# Patient Record
Sex: FEMALE | Employment: OTHER | URBAN - METROPOLITAN AREA
[De-identification: names, ages, dates, MRNs, and addresses within clinical notes are randomized per-mention and may not be internally consistent; named-entity substitution may affect disease eponyms.]

---

## 2022-01-06 ENCOUNTER — TELEPHONE (OUTPATIENT)
Dept: NEUROLOGY | Facility: CLINIC | Age: 72
End: 2022-01-06
Payer: MEDICARE

## 2022-01-12 ENCOUNTER — TELEPHONE (OUTPATIENT)
Dept: NEUROLOGY | Facility: CLINIC | Age: 72
End: 2022-01-12
Payer: MEDICARE

## 2022-01-12 NOTE — TELEPHONE ENCOUNTER
----- Message from Dorothy Ervin sent at 1/12/2022  4:32 PM CST -----  Contact: Daughter(Augustus)  Type:  Patient Returning Call    Who Called: Daughter (Augustus)  Who Left Message for Patient:you  Does the patient know what this is regarding?:no  Would the patient rather a call back or a response via [facility]? Call back   Best Call Back Number: [phone]  Additional Information:

## 2022-01-18 ENCOUNTER — TELEPHONE (OUTPATIENT)
Dept: NEUROLOGY | Facility: CLINIC | Age: 72
End: 2022-01-18
Payer: MEDICARE

## 2022-01-18 NOTE — TELEPHONE ENCOUNTER
----- Message from Jessi Trevino sent at 1/18/2022  1:28 PM CST -----  Contact: [place]-Daughter  Calling in regards to pt apt on 01/24/2022. Pt would like to know if she would need to bring medical records from previous provider. Please call [phone]

## 2022-01-24 ENCOUNTER — OFFICE VISIT (OUTPATIENT)
Dept: NEUROLOGY | Facility: CLINIC | Age: 72
End: 2022-01-24
Payer: MEDICARE

## 2022-01-24 DIAGNOSIS — F33.41 RECURRENT MAJOR DEPRESSIVE DISORDER, IN PARTIAL REMISSION: ICD-10-CM

## 2022-01-24 DIAGNOSIS — F41.1 GAD (GENERALIZED ANXIETY DISORDER): ICD-10-CM

## 2022-01-24 DIAGNOSIS — R41.89 COGNITIVE CHANGES: Primary | ICD-10-CM

## 2022-01-24 PROCEDURE — 96116 PR NEUROBEHAVIORAL STATUS EXAM BY PSYCH/PHYS: ICD-10-PCS | Mod: S$PBB,,, | Performed by: STUDENT IN AN ORGANIZED HEALTH CARE EDUCATION/TRAINING PROGRAM

## 2022-01-24 PROCEDURE — 99999 PR PBB SHADOW E&M-EST. PATIENT-LVL II: CPT | Mod: PBBFAC,,, | Performed by: STUDENT IN AN ORGANIZED HEALTH CARE EDUCATION/TRAINING PROGRAM

## 2022-01-24 PROCEDURE — 96116 NUBHVL XM PHYS/QHP 1ST HR: CPT | Mod: PBBFAC | Performed by: STUDENT IN AN ORGANIZED HEALTH CARE EDUCATION/TRAINING PROGRAM

## 2022-01-24 PROCEDURE — 96116 NUBHVL XM PHYS/QHP 1ST HR: CPT | Mod: S$PBB,,, | Performed by: STUDENT IN AN ORGANIZED HEALTH CARE EDUCATION/TRAINING PROGRAM

## 2022-01-24 PROCEDURE — 99212 OFFICE O/P EST SF 10 MIN: CPT | Mod: PBBFAC,25 | Performed by: STUDENT IN AN ORGANIZED HEALTH CARE EDUCATION/TRAINING PROGRAM

## 2022-01-24 PROCEDURE — 99499 NO LOS: ICD-10-PCS | Mod: S$PBB,,, | Performed by: STUDENT IN AN ORGANIZED HEALTH CARE EDUCATION/TRAINING PROGRAM

## 2022-01-24 PROCEDURE — 96121 PR NEUROBEHAVIORAL STAT EXAM, EA ADDTL HR: ICD-10-PCS | Mod: S$PBB,,, | Performed by: STUDENT IN AN ORGANIZED HEALTH CARE EDUCATION/TRAINING PROGRAM

## 2022-01-24 PROCEDURE — 96121 NUBHVL XM PHY/QHP EA ADDL HR: CPT | Mod: S$PBB,,, | Performed by: STUDENT IN AN ORGANIZED HEALTH CARE EDUCATION/TRAINING PROGRAM

## 2022-01-24 PROCEDURE — 99999 PR PBB SHADOW E&M-EST. PATIENT-LVL II: ICD-10-PCS | Mod: PBBFAC,,, | Performed by: STUDENT IN AN ORGANIZED HEALTH CARE EDUCATION/TRAINING PROGRAM

## 2022-01-24 PROCEDURE — 99499 UNLISTED E&M SERVICE: CPT | Mod: S$PBB,,, | Performed by: STUDENT IN AN ORGANIZED HEALTH CARE EDUCATION/TRAINING PROGRAM

## 2022-01-24 PROCEDURE — 96121 NUBHVL XM PHY/QHP EA ADDL HR: CPT | Mod: PBBFAC | Performed by: STUDENT IN AN ORGANIZED HEALTH CARE EDUCATION/TRAINING PROGRAM

## 2022-01-24 RX ORDER — MONTELUKAST SODIUM 10 MG/1
10 TABLET ORAL NIGHTLY
COMMUNITY

## 2022-01-24 RX ORDER — LISINOPRIL 20 MG/1
20 TABLET ORAL DAILY
COMMUNITY

## 2022-01-24 RX ORDER — QUETIAPINE FUMARATE 25 MG/1
25 TABLET, FILM COATED ORAL NIGHTLY
COMMUNITY

## 2022-01-24 RX ORDER — DONEPEZIL HYDROCHLORIDE 10 MG/1
10 TABLET, FILM COATED ORAL NIGHTLY
COMMUNITY

## 2022-01-24 RX ORDER — VILAZODONE HYDROCHLORIDE 20 MG/1
TABLET ORAL
COMMUNITY

## 2022-01-24 RX ORDER — POTASSIUM CHLORIDE 600 MG/1
8 TABLET, FILM COATED, EXTENDED RELEASE ORAL DAILY
COMMUNITY

## 2022-01-24 RX ORDER — ATORVASTATIN CALCIUM 20 MG/1
20 TABLET, FILM COATED ORAL DAILY
COMMUNITY

## 2022-01-24 NOTE — PATIENT INSTRUCTIONS
Today you completed the interview portion of your neuropsychological evaluation. The next step will be to come in for a testing appointment on February 7th 2022 at 12:30 p.m.    Please call Dr. Luis at [phone] or send a [facility] [employer] message with any questions.       _____________________  Romain Luis, Ph.D.  Neuropsychologist  [facility]  Department of Neurology      Instructions for your upcoming neuropsychological assessment     If you must cancel your appointment please do so 48 hours in advance of your scheduled appointment.    Please take all of your medications as prescribed on the date of testing, unless specifically asked not to do so.    Be sure to eat a full breakfast the morning before testing if you typically eat in the morning.    Bring any water, snacks, or other food supplies you may need for a 2-4 hour appointment.   Do your best to get a good night of sleep before testing.   There is nothing you should do or need to do to study for the tests. Just come ready to do your best.

## 2022-01-24 NOTE — PROGRESS NOTES
NEUROPSYCHOLOGY CONSULT    Referral Information  NAME:  Hilda Santos DATE OF SERVICE: 2022   MRN#:  [id number] EDUCATION: 13   AGE: 71 y.o. HANDEDNESS: Right    : 1950 RACE:    SEX: Female REFERRAL: John Jackson MD; Neurology, [facility]     SOURCES OF INFORMATION:  The following was gathered from a clinical interview with Ms. Hilda Santos completed in-person in the Neurology department at [facility] [place] [facility], review of the available medical records, and administration of a series of neuropsychological tests, listed in the Results section of this report. Ms. Santos expressed an understanding of the purpose of the evaluation and consented to all procedures.    The chief complaint/medical necessity leading to consultation/medical necessity is: cognitive decline.    NEUROPSYCHOLOGICAL EVALUATION - CONFIDENTIAL    Ms. Santos is a 71 y.o., right-handed, , woman with 13 years of formal education. She was referred by her neurologist due to cognitive concerns in the context of multiple cerebrovascular risk factors and a CVA in 2021. During interview, Ms. Santos and her daughter reported the onset of more subtle memory concerns as early as , worsened memory, executive functions marked by impulsivity, and subtle errors in orientation to time that initially improved significantly after her CVA and have subsequently either remained stable or have subtly improved over the last few months. Emotionally, Ms. Santos reported depression and anxiety with irritability but discussed that these symptoms have significantly improved with treatment under the care of her psychiatric provider.     Ms. Santos has a history of CVA and white matter abnormalities on brain imaging, which increases the risk of an organic etiology for her cognitive concerns. Although she has a history of depression and anxiety, these factors appear to be well-controlled with  her current treatment and would not preclude gathering of adequate neuropsychological data at this time. As such, formal neuropsychological testing is clinically indicated in order to aid in differential diagnosis and treatment planning.      Diagnoses  1. Cognitive changes  Ambulatory referral/consult to Adult Neuropsychology   2. Recurrent major depressive disorder, in partial remission     3. KUSUM (generalized anxiety disorder) - From history         PLAN/ RECOMMENDATIONS     Provider Recommendations:   On the basis of the above summary, neuropsychological testing is clinically indicated at this time. MS. Santos has been scheduled for comprehensive neuropsychological testing. A detailed report including detailed diagnostic information and recommendations will be completed after testing has been completed.     Patient Recommendations:  The next step in your care is to complete neuropsychological testing on 02/07/2022 at 12:30 p.m. Please review your after visit summary for more information about your testing appointment.     The above plan/ recommendations were discussed with Ms. Santos and her daughter during her appointment today.     Thank you for allowing me to participate in Ms. Llanos care.  If you have any questions, please contact me at [phone].        _____________________  Romain Luis, Ph.D.  Neuropsychologist  [facility]  Department of Neurology    HISTORY OF PRESENT ILLNESS: Ms. Hilda Santos is an 71 y.o., right-handed, -American female with 13 years of education who was referred for a neuropsychological evaluation in the setting of multiple embolic CVAs in 01/2021, with the largest being left frontal 1.7cm and right corona radiata 1.6cm. Her family expressed initial concern for memory concerns prompting workup by her referring neurologist, Dr. Jackson. Neurologic exam on 07/22/2021 was notable for very mild left sided weakness and brisk left sided reflexes with upgoing toe. She was  started her on titration of Donepizil to 10mg QD on  and referred her for neuropsychological testing. Although she has a history of migraine, Ms. Santos and her daughter reported that headache discontinued after her above CVA.     Onset and course of Cognitive Concerns: Her daughter first noticed memory difficulties between 2016 and 2018, noticed worsened memory after her stroke, and reported subsequent generally stable or subtly improved cognitive functioning since that time. Per Ms. Santos memory changes began approximately 6 months ago and have improved since.     Characterization of Cognitive Concerns  Attention/ working memory: Ms. Santos denied attention or working memory concerns.  Processing speed: Ms. Santos denied slowing of processing speed.  Language: Ms. Santos denied language difficulties.  Visual-spatial/ navigation: Ms. Santos denied difficulties with visual-spatial skills or navigation.  Psychomotor: Ms. Santos reported left arm weakness and some reduced balance since her stroke.  Memory: Ms. Santos and her daughter reported difficulties with forgetfulness for recent conversations, forgetting purchases, and asking repeated questions. Reminder cues are often helpful.  Decision making: Ms. Santos denied difficulties with decision-making or reasoning skills, though her daughter reported some impulsiveness specifically regarding spending; particularly with regard to fashion and new clothes. Ms. Santos was noted to have possible increased sweets preference with strong desire to eat fruits. There are no candies or other sweets in the home.   Orientation: Ms. Santos and her daughter reported intermittent mild errors in orientation; e.g. to day of the week and date.    Neuropsychiatric Symptoms:  Hallucinations: Denied   Delusional/Paranoid Thinking: Unclear - Ms. Santos reported having heard threats from her estranged  but her daughter has not heard those threats.   Apathy:  "Endorsed  Irritability/Agitation: Endorsed but this is getting better  Disinhibition: Endorsed, but only when she is angry or upset.   Depression/Labile Mood: Denied currently.   Anxiety: Endorsed but this is improving over time.     DAILY FUNCTIONING:  BASIC ADLS:  Feeding: independent  Dressing: independent  Bathing: independent  Toileting: independent    IADLS:  Support System: Daughter  Appointment Management: dependent on daughter  Medication Compliance: Her daughter provides oversight  Financial Management: Her daughter provides reminders, and there have been some forgotten purchases.  Cooking: Independent but reported that her daughter is a better cook and does so. Her daughter reported some safety concerns while cooking, e.g. leaving bread in the oven.  Driving: Does not drive since before her CVA.       BRAIN HEALTH RISK FACTORS:  Hearing Loss: Endorsed  Physical Activity: Walks outside or uses treadmill for 30 minutes a day  Social Isolation: Endorsed reduced social engagement but denied venkata isolation.   Falls: Denied  Sleep: "Not so good at all." She reported 4 hours a night. She goes to bed between 10:00-12:00 and falls asleep between 03:00-04:00. She gets up between 09:00-10:00 most mornings.    MEDICAL HISTORY: Notable for the above; hypertension, hyperlipidemia, history of headache, and status post CABG in 2021.     Family history is reportedly notable for possible dementia in her brother, heart attack in her mother, and dementia in her father ( age 70).    NEUROIMAGING:  Brain MRI: Ms. Santos completed a brain MRI on 2021 at [facility] with stated indication of "dizziness and giddiness." That study was interpreted as reflecting "numerous small pontine T2 hyperintensities... very small basal ganglia T2 hyperintensities... increased T2 signal in the left corona radiata anteriorly, measuring 16mm x 5.5mm... bilateral white matter T2 hyperintensities are noted. The " "largest is in the right parietal lobe anteriorly and measures 12mm x 5mm."     Current medications:   Current Outpatient Medications:     montelukast (SINGULAIR) 10 mg tablet, Take 10 mg by mouth every evening., Disp: , Rfl:     potassium chloride (KLOR-CON) 8 MEQ TbSR, Take 8 mEq by mouth once daily. Taking two daily, Disp: , Rfl:     atorvastatin (LIPITOR) 20 MG tablet, Take 20 mg by mouth once daily., Disp: , Rfl:     donepeziL (ARICEPT) 10 MG tablet, Take 10 mg by mouth every evening., Disp: , Rfl:     lisinopriL (PRINIVIL,ZESTRIL) 20 MG tablet, Take 20 mg by mouth once daily., Disp: , Rfl:     QUEtiapine (SEROQUEL) 25 MG Tab, Take 25 mg by mouth every evening., Disp: , Rfl:     vilazodone (VIIBRYD) 20 mg Tab, Take by mouth., Disp: , Rfl:     Allergies: Bactrim    PSYCHIATRIC HISTORY: Ms. Santos is followed by DAWN Martinez FNP-C for psychiatric care. Most-recent available treatment notes from 12/16/2021 document management of severe major depressive disorder, recurrent, and generalized anxiety disorder via Viibryd 20mg QD and quetiapine 25mg QHS. Treatment notes document improvements in symptoms of depression, sleep disturbance, and anxiety without reported medication side-effects.    SUICIDAL IDEATION:  History: Denied  Active Suicidal Ideation:Denied  Plan/Intent: Denied    FAMILY HISTORY: family history is not on file.    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      Vocation:   Highest Attained: [employer] at [employer]   Retired: "Years ago."     Relationship Status:   : yes   : no;    Children: 4 children - 2 local.     MENTAL STATUS AND OBSERVATIONS:  APPEARANCE: Appropriately dressed and adequate grooming/hygiene. " "  ALERTNESS/ORIENTATION: Attentive and alert.   GAIT/MOTOR: No fine or gross motor abnormalities noted.     SENSORY: Vision and hearing appeared appropriate for testing purposes.  SPEECH/LANGUAGE: Normal in rate, rhythm, tone, and volume. Expressive and receptive language were grossly intact.  STATED MOOD/AFFECT: Mood was "Fine, terrific." Affect was congruent with stated mood.   INTERPERSONAL BEHAVIOR: Rapport was quickly and easily established   THOUGHT PROCESSES: Thoughts seemed logical and goal-directed.       Billing Documentation     Time spent in review of pertinent records, conducting face to face interview with the patient, and documenting history: 154 minutes; [id number] & [id number](x2).    "

## 2022-02-07 ENCOUNTER — TELEPHONE (OUTPATIENT)
Dept: NEUROLOGY | Facility: CLINIC | Age: 72
End: 2022-02-07
Payer: MEDICARE

## 2022-02-07 NOTE — TELEPHONE ENCOUNTER
Spoke to patient's daughter to reschedule testing to Friday. She was frustrated due to playing phone tag.

## 2022-02-07 NOTE — TELEPHONE ENCOUNTER
Spoke to pt's daughter to move testing to tomorrow. She told me to go ahead and move it, and she'd give the clinic a call back if it doesn't work for her mother

## 2022-02-11 ENCOUNTER — TELEPHONE (OUTPATIENT)
Dept: NEUROLOGY | Facility: CLINIC | Age: 72
End: 2022-02-11
Payer: MEDICARE

## 2022-02-11 ENCOUNTER — OFFICE VISIT (OUTPATIENT)
Dept: NEUROLOGY | Facility: CLINIC | Age: 72
End: 2022-02-11
Payer: MEDICARE

## 2022-02-11 DIAGNOSIS — F02.80 MAJOR NEUROCOGNITIVE DISORDER DUE TO MULTIPLE ETIOLOGIES: ICD-10-CM

## 2022-02-11 DIAGNOSIS — F33.42 RECURRENT MAJOR DEPRESSIVE DISORDER, IN FULL REMISSION: Primary | ICD-10-CM

## 2022-02-11 DIAGNOSIS — F41.1 GENERALIZED ANXIETY DISORDER: ICD-10-CM

## 2022-02-11 PROCEDURE — 96138 PR PSYCH/NEUROPSYCH TEST ADMIN/SCORING, BY TECH, 2+ TESTS, 1ST 30 MIN: ICD-10-PCS | Mod: S$PBB,,, | Performed by: STUDENT IN AN ORGANIZED HEALTH CARE EDUCATION/TRAINING PROGRAM

## 2022-02-11 PROCEDURE — 96138 PSYCL/NRPSYC TECH 1ST: CPT | Mod: S$PBB,,, | Performed by: STUDENT IN AN ORGANIZED HEALTH CARE EDUCATION/TRAINING PROGRAM

## 2022-02-11 PROCEDURE — 99999 PR PBB SHADOW E&M-EST. PATIENT-LVL II: ICD-10-PCS | Mod: PBBFAC,,, | Performed by: STUDENT IN AN ORGANIZED HEALTH CARE EDUCATION/TRAINING PROGRAM

## 2022-02-11 PROCEDURE — 99212 OFFICE O/P EST SF 10 MIN: CPT | Mod: PBBFAC | Performed by: STUDENT IN AN ORGANIZED HEALTH CARE EDUCATION/TRAINING PROGRAM

## 2022-02-11 PROCEDURE — 99999 PR PBB SHADOW E&M-EST. PATIENT-LVL II: CPT | Mod: PBBFAC,,, | Performed by: STUDENT IN AN ORGANIZED HEALTH CARE EDUCATION/TRAINING PROGRAM

## 2022-02-11 PROCEDURE — 99499 NO LOS: ICD-10-PCS | Mod: S$PBB,,, | Performed by: STUDENT IN AN ORGANIZED HEALTH CARE EDUCATION/TRAINING PROGRAM

## 2022-02-11 PROCEDURE — 96132 PR NEUROPSYCHOLOGIC TEST EVAL SVCS, 1ST HR: ICD-10-PCS | Mod: S$PBB,,, | Performed by: STUDENT IN AN ORGANIZED HEALTH CARE EDUCATION/TRAINING PROGRAM

## 2022-02-11 PROCEDURE — 96132 NRPSYC TST EVAL PHYS/QHP 1ST: CPT | Mod: S$PBB,,, | Performed by: STUDENT IN AN ORGANIZED HEALTH CARE EDUCATION/TRAINING PROGRAM

## 2022-02-11 PROCEDURE — 96139 PR PSYCH/NEUROPSYCH TEST ADMIN/SCORING, BY TECH, 2+ TESTS, EA ADDTL 30 MIN: ICD-10-PCS | Mod: S$PBB,,, | Performed by: STUDENT IN AN ORGANIZED HEALTH CARE EDUCATION/TRAINING PROGRAM

## 2022-02-11 PROCEDURE — 96133 NRPSYC TST EVAL PHYS/QHP EA: CPT | Mod: S$PBB,,, | Performed by: STUDENT IN AN ORGANIZED HEALTH CARE EDUCATION/TRAINING PROGRAM

## 2022-02-11 PROCEDURE — 96133 PR NEUROPSYCHOLOGIC TEST EVAL SVCS, EA ADDTL HR: ICD-10-PCS | Mod: S$PBB,,, | Performed by: STUDENT IN AN ORGANIZED HEALTH CARE EDUCATION/TRAINING PROGRAM

## 2022-02-11 PROCEDURE — 99499 UNLISTED E&M SERVICE: CPT | Mod: S$PBB,,, | Performed by: STUDENT IN AN ORGANIZED HEALTH CARE EDUCATION/TRAINING PROGRAM

## 2022-02-11 PROCEDURE — 96139 PSYCL/NRPSYC TST TECH EA: CPT | Mod: S$PBB,,, | Performed by: STUDENT IN AN ORGANIZED HEALTH CARE EDUCATION/TRAINING PROGRAM

## 2022-02-14 ENCOUNTER — TELEPHONE (OUTPATIENT)
Dept: NEUROLOGY | Facility: CLINIC | Age: 72
End: 2022-02-14
Payer: MEDICARE

## 2022-02-14 NOTE — TELEPHONE ENCOUNTER
Spoke to her daughter Nick  to let her know when the feedback appointment was, since she wasn't here for the testing appointment when we made it, and she doesn't use MyChart. Mailed involvement of care form. She expressed concern about how far out it was, and I explained that her mother made the appointment. Daughter wanted to make sure the report gets mailed to her after they discuss it and that she will get a phone number to call Dr. Luis back after they chat in case she thinks of any questions after. She also let me know the name of her mother's neurologist, and said they'd be faxing paperwork for communication if they hadn't done so already.

## 2022-02-14 NOTE — TELEPHONE ENCOUNTER
Her daughter gave me her direct number, [phone]. I left a message letting her know had one more question for her and needed her to call me back to set up a time to discuss that. Left the neurology number since my phone still isn't working.

## 2022-02-15 ENCOUNTER — TELEPHONE (OUTPATIENT)
Dept: NEUROLOGY | Facility: CLINIC | Age: 72
End: 2022-02-15
Payer: MEDICARE

## 2022-02-21 NOTE — PROGRESS NOTES
NEUROPSYCHOLOGY CONSULT    Referral Information  NAME:  Hilda Santos DATES OF SERVICE: 2022 & 2022   MRN#:  [id number] EDUCATION: 13   AGE: 71 y.o. HANDEDNESS: Right    : 1950 RACE:    SEX: Female REFERRAL: John Jackson MD; Neurology, [facility]     SOURCES OF INFORMATION:  The following was gathered from a clinical interview with Ms. Hilda Santos completed in-person in the Neurology department at [facility] [place] [facility], review of the available medical records, and administration of a series of neuropsychological tests, listed in the Results section of this report. One additional test was also administered on 2022 via [facility]. Time spent in administration and scoring of services provided on 2022 are not reflected in billing. Ms. Santos expressed an understanding of the purpose of the evaluation and consented to all procedures.    The chief complaint/medical necessity leading to consultation/medical necessity is: cognitive decline.    NEUROPSYCHOLOGICAL EVALUATION - CONFIDENTIAL    SUMMARY/TREATMENT PLAN   Summary of History:  Ms. Santos is a 71 y.o., right-handed, , woman with 13 years of formal education. She was referred by her neurologist due to cognitive concerns in the context of multiple cerebrovascular risk factors and a cerebrovascular accident (CVA) in 2021. During interview, Ms. Santos and her daughter reported the initial onset of more subtle memory concerns as early as . After her  CVA, Ms. Santos and her daughter reported worsened memory, executive functions marked by impulsivity, and errors in orientation to time that initially improved significantly during the expected recovery period and have subsequently either remained stable or have subtly improved over the last few months. Emotionally, Ms. Santos reported depression and anxiety with irritability but discussed that these symptoms have  "significantly improved with treatment under the care of her psychiatric provider.    Test Results:  Table of Normative Performances   Extremely high   (98+ %ile)             Above average  (91-97 %ile)              High average  (75-90 %ile)             Average  (25-74 %ile)       X   R   Low average  (9-24 %ile) X  X      X L   Below average  (2-8 %ile)  X X     X     Extremely low  (< 2 %ile)    X X X X X X     Est. Baseline Attention Working Memory Proc. Speed Verbal Memory Visual Memory Language Visual- Spatial Executive Motor   Note: Scores are marked by an "x." Where performances vary, two "x" marks are placed to show the range. Detailed data may be found in the Results section of this report.   WNL = Within Normal Limits; used for tests that are not normally distributed. R = right hand, L = left hand.    Key Findings:   · Estimated low-average baseline cognitive functioning on the basis of a word-reading test and based on an estimate including that performance along with demographic factors including her educational and vocational history. Her relatively lower score on an information task is estimated to reflect low baseline knowledge of those items.   · Ms. Santos demonstrated relative sparing on tasks of naming to confrontation, fine motor dexterity, a clock drawing task, and a working memory task.   · Ms. Santos' performances were otherwise below expectations, suggesting cognitive declines.  · Ms. Santos' performances on skills-based assessments of daily living skills and the endorsements of her daughter on a standardized report inventory of independent living skills suggest difficulty with instrumental activities of daily living due to her cognitive changes.   · Ms. Santos did not endorse clinically-significant symptoms of depression or anxiety on screening measure, consistent with her report during interview.     Diagnostic Considerations: Ms. Santos' course and trajectory of sudden episodes of worsening " cognition following CVAs and pattern of significant processing speed, set-shifting, frontal-greater-than-temporal language systems dysfunction, lateralized motor performance, and visuospatial dysfunction suggests a primary cerebrovascular etiology. This profile is consistent with typical changes associated with cerebrovascular disease and her right parietal findings on brain MRI. However, Ms. Santos additionally produced an amnestic memory profile. Although a strategic infarct may cause profound memory deficits, there is no documentation on available brain imaging to suggest a history of thalamic or hippocampal infarcts that would coincide with that etiology. Ms. Santos' history is also notable for a possible onset of cognitive concerns that preceded her most-recent CVA. As such, a co-occurring Alzheimer's process should be considered a possibility as slow and insidious declines may be difficult for Ms. Santos and her family to perceive in the context of CVA and associated recovery trajectory. Her significant cognitive concerns paired with declines in independent living status are diagnostic of a major neurocognitive disorder at this time.     Diagnoses  1. Recurrent major depressive disorder, in full remission     2. Major neurocognitive disorder due to multiple etiologies  Ambulatory referral/consult to Adult Neuropsychology    Probable cerebrovascular disease and possible Alzheimer's disease   3. Generalized anxiety disorder Well controlled         Provider Recommendations:     1. Ms. Santos should continue to follow with her referring neurology provider in order manage treatment and to integrate the results of this assessment into the broader context of her care.     2. Ms. Santos should continue to follow with her psychiatry provider in order to maintain good management of depression and anxiety.     3. Ms. Santos and her family will be provided with the below recommendations in order to help cordiante her  care.     4. Ms. Santos would benefit from an in-home occupational therapy evaluation in order to help determine her care needs at this time and to help maintain her curent independent living status.    5. Ms. Santos should be encouraged to establish a durable power of  so that a trusted person can help advocate for her financial and medical care needs, consistent with her wishes and best interest.     6. Ms. Santos will be provided with behavioral recommendations to improve sleep. Her providers may wish to consider referral to sleep medicine or medications to help improve sleep if those skills are eith not effective or prove difficult for her to implement.     7. Ms. Santos would benefit from referral for re-evaluation in approximately 12 months to track cognitive functioning and update her treatment plan. At that time, more precise determinations can be made about differential diagnosis.     Patient Recommendations:  1. At this time, your scores on testing and your history are concerning for major neurocognitive disorder, also known as dementia. It is most likely that cognitive changes are due to a combination of factors including your history of stroke and possible Alzheimer's disease. The below recommendations can help you and your family plan your care.     2. If you have not already done so, I recommend that you set up a durable power of  to allow a trusted person to help you make medical and financial decisions. This can help reduce your risk of falling victim to scams and financial errors; and also make sure that someone you trust can advocate for your medical care as you would wish them to.     3. In order to help reduce daily difficulties due to Ms. Santos's cognitive concerns, the following practical recommendations may be helpful for her family and caregivers:   Present information to Ms. Santos as a story, or relate it to information she already knows, to aid her later recall.    She  is less likely to remember unrelated facts, like her medication list or a shopping list.    Provide cues, such as the first sound of a word or the meaning of the word, to aid in retrieval.    4. Given Ms. Santos' cognitive concerns, the following safety recommendations are made to help keep her and others safe:   Ms. Santos should complete a formal on-road driving evaluation if she wishes to return to driving.   Ms. Santos should be supervised when cooking to ensure safe handling of hot surfaces and that the stove and oven are not left on.   Ms. Santos will need continued assistance with medication management to ensure that she takes the correct medications and doses at the right times and obtains refills as needed.   Ms. Santos is at increased risk for wandering and getting lost, even in familiar surroundings, and may need to be supervised when out of the house.    5. An occupational therapy evaluation for formal assessment of independent living skills and related safety concerns could help determine the appropriate level of supervision.     6. There are steps you can take to improve your long-term brain health and reduce your risk for cognitive decline in the future. I encourage you to follow the recommendations in your daily life:   Continue to engage in physical activity (i.e., something that gets your heart rate up) totaling at least 15-30 minutes per day. Regular exercise is very important for both long-term health and stress management. Walking, hiking, elliptical, stationary biking, dancing, and yoga are all good options. You are walking daily which I strongly support.   Work closely with your doctor(s) to manage any vascular conditions such as high blood pressure, high cholesterol, and diabetes. Follow the management guidelines from the American Heart Association at www.heart.org.   Remain mentally engaged by keeping socially active, reading, learning new skills, playing games, or participating in a  hobby.   Get a good night's sleep by avoiding screens 30-60 minutes before bed and sticking to a regular sleep schedule.   Eat a balanced, heart-healthy diet that is low in salt, saturated fats, and added sugar. The Mediterranean diet has been shown to be especially healthy; studies show that it may reduce the risk of developing dementia and slow the rate of age-related cognitive decline.     7. As dementia/Alzheimer's progresses and an individual's memory and ability to verbally communicate decline, various behavioral symptoms often arise. These can include aggression, suspicion, anxiety/agitation, wandering, confusion, and repetitive speech or actions. To manage these behaviors, the following strategies are recommended:   Try to identify the cause of any acting out behavior. Pain is often a trigger, and should be ruled out. Overstimulation, unfamiliar surroundings, and inability to communicate effectively are other common causes.   Maintain a calm voice and gently reassure the individual.   Use memory aids placed in the individual's environment, such as photographs, simple checklists (e.g., brush teeth, shower, get dressed, eat breakfast), sticky notes for important reminders, a clock, and/or a calendar.   Redirect the person to another activity, or try something soothing like music or massage. Give the individual an outlet for any excess energy.   Speak slowly using grammatically simple sentences. Present one piece of information or one instruction at a time. When asking questions, offer 2-3 answer choices.   Respond to confusion with a brief explanation of the situation, but avoid arguing with the individual or trying to convince them of something. Offer corrections as suggestions, such as, I think you are X years old.   Maintain a safe environment and lock doors and windows if wandering is a problem.   Decrease noise and distractions in the environment or relocate the person.   Involve the  individual in their care as much as possible and present choices, even if that simply means asking if they would like to shower now or in an hour. Maintaining a sense of personal agency is important for mental health, and the decision making aspect is cognitively stimulating.    Additional information about Alzheimer's symptoms, behavioral management, and local resources are available from the Alzheimer's Association at https://alz.org or (604) 233-8122 (24-hour helpline).    8. If the resources on the sleep hygiene handout are not sufficient to help improve the quality of your sleep, a referral to sleep medicine may also help improve the quality of your sleep.     9. I recommend repeat evaluation in one year in order to track for changes over time, help better understand your diagnoses, and update your treatment plan.       Ms. Santos will be provided the results of the evaluation.     Thank you for allowing me to participate in Ms. Llanos care.  If you have any questions, please contact me at [phone].        _____________________  Romain Luis, Ph.D.  Neuropsychologist  [facility]  Department of Neurology    HISTORY OF PRESENT ILLNESS: Ms. Hilda Santos is an 71 y.o., right-handed, -American female with 13 years of education who was referred for a neuropsychological evaluation in the setting of multiple embolic CVAs in 01/2021, with the largest being left frontal 1.7cm and right corona radiata 1.6cm. Her family expressed concerns for memory changes, which prompted workup by her referring neurologist, Dr. Jackson. Neurologic exam on 07/22/2021 was notable for very mild left sided weakness and brisk left sided reflexes with upgoing toe. She was started her on titration of Donepizil to 10mg QD on  and referred her for neuropsychological testing. Although she has a history of migraine, Ms. Santos and her daughter reported that headache discontinued after her above CVA.      Onset and course of  Cognitive Concerns: Her daughter first noticed memory difficulties between 2016 and 2018, noticed worsened memory after her stroke in 01/2021, and reported subsequent generally stable or subtly improved cognitive functioning since that time. Per Ms. Santos memory changes began approximately 6 months ago and have improved since.      Characterization of Cognitive Concerns  Attention/ working memory: Ms. Santos denied attention or working memory concerns.  Processing speed: Ms. Santos denied slowing of processing speed.  Language: Ms. Santos denied language difficulties.  Visual-spatial/ navigation: Ms. Santos denied difficulties with visual-spatial skills or navigation.  Psychomotor: Ms. Santos reported left arm weakness and some reduced balance since her stroke.  Memory: Ms. Santos and her daughter reported difficulties with forgetfulness for recent conversations, forgetting purchases, and asking repeated questions. Reminder cues are often helpful.  Decision making: Ms. Santos denied difficulties with decision-making or reasoning skills, though her daughter reported some impulsiveness specifically regarding spending; particularly with regard to fashion and new clothes. Ms. Santos was noted to have possible increased sweets preference with strong desire to eat fruits. There are no candies or other sweets in the home.   Orientation: Ms. Santos and her daughter reported intermittent mild errors in orientation; e.g. to day of the week and date.     Neuropsychiatric Symptoms:  Hallucinations: Denied   Delusional/Paranoid Thinking: Unclear - Ms. Santos reported having heard threats from her estranged  but her daughter has not heard those threats.   Apathy: Endorsed  Irritability/Agitation: Endorsed but this is getting better  Disinhibition: Endorsed, but only when she is angry or upset.   Depression/Labile Mood: Denied currently.   Anxiety: Endorsed anxiety but this is improving over time. She denied  "clinically-significant anxiety during interview.      DAILY FUNCTIONING:  BASIC ADLS:  Feeding: independent  Dressing: independent  Bathing: independent  Toileting: independent     IADLS:  Support System: Daughter  Appointment Management: dependent on daughter  Medication Compliance: Her daughter provides oversight  Financial Management: Her daughter provides reminders, and there have been some forgotten purchases.  Cooking: Independent but reported that her daughter is a better cook and does so. Her daughter reported some safety concerns while cooking, e.g. leaving bread in the oven.  Driving: Does not drive since before her CVA.        BRAIN HEALTH RISK FACTORS:  Hearing Loss: Endorsed  Physical Activity: Walks outside or uses treadmill for 30 minutes a day  Social Isolation: Endorsed reduced social engagement but denied venkata isolation.   Falls: Denied  Sleep: "Not so good at all." She reported 4 hours a night. She goes to bed between 10:00-12:00 and falls asleep between 03:00-04:00. She gets up between 09:00-10:00 most mornings.     MEDICAL HISTORY: Notable for the above; hypertension, hyperlipidemia, history of headache, and status post CABG in 2021.     Family history is reportedly notable for possible dementia in her brother, heart attack in her mother, and dementia in her father ( age 70).     NEUROIMAGING:  Brain MRI: Ms. Santos completed a brain MRI on 2021 at [facility] with stated indication of "dizziness and giddiness." That study was interpreted as reflecting "numerous small pontine T2 hyperintensities... very small basal ganglia T2 hyperintensities... increased T2 signal in the left corona radiata anteriorly, measuring 16mm x 5.5mm... bilateral white matter T2 hyperintensities are noted. The largest is in the right parietal lobe anteriorly and measures 12mm x 5mm."     Current medications: Ms. Santos has a current medication list which includes the following " "prescription(s): atorvastatin, donepezil, lisinopril, montelukast, potassium chloride, quetiapine, and viibryd.     Review of patient's allergies indicates:   Allergen Reactions    Bactrim [sulfamethoxazole-trimethoprim]        PSYCHIATRIC HISTORY: Ms. Santos is followed by DAWN Martinez FNP-C for psychiatric care. Most-recent available treatment notes from 12/16/2021 document management of severe major depressive disorder, recurrent, and generalized anxiety disorder via Viibryd 20mg QD and quetiapine 25mg QHS. Treatment notes document improvements in symptoms of depression, sleep disturbance, and anxiety without reported medication side-effects.     SUICIDAL IDEATION:  History: Denied  Active Suicidal Ideation:Denied  Plan/Intent: Denied     FAMILY HISTORY: family history is not on file.     PSYCHOSOCIAL HISTORY:   Education:              Level Attained: 13 - studied at [employer] for one year in [facility]              Learning Difficulties: Denied              Special Education: Denied              Repeated Grade: Endorsed having repeated the 1st grade due to health issues and associated absences but denied learning differences.                 Vocation:              Highest Attained: [employer] at [employer]              Retired: "Years ago."      Relationship Status:              : yes              : no;               Children: 4 children - 2 local.     MENTAL STATUS AND OBSERVATIONS:  APPEARANCE: Casually dressed and adequate grooming/hygiene.   ALERTNESS/ORIENTATION: Attentive and alert.   GAIT/MOTOR: Within normal limits.   SENSORY: Hearing and corrected vision appeared adequate for assessment purposes.  SPEECH/LANGUAGE: Normal in rate, rhythm, tone, and volume. Expressive and receptive language were grossly intact.  MOOD/AFFECT: Mood was generally euthymic although she became anxious at times during testing, particularly before she acclimated to the testing " process. She demonstrated good use of humor, particularly once testing anxiety reduced.   INTERPERSONAL BEHAVIOR: Rapport was quickly and easily established   THOUGHT PROCESSES: Thoughts seemed logical and goal-directed. She introduced herself to the examiner both at the beginning and at the conclusion of testing  TESTING OBSERVATIONS: She attended to task instructions and often repeated them back to the examiner. She worked at a consistent pace throughout testing. No observations suggested behaviors that interfered with gathering test data.    RESULTS     Tests Administered: [employer] - Test of Premorbid Functioning (TOPF), Wechsler Adult Intelligence Scale 4th Ed. (WAIS-IV) select subtests (Digit Span, Coding, Similarities, Block Design, and Information), Neuropsychological Assessment Battery (NAB) Naming, Ivan Verbal Learning Test, Revised (HVLT-R), Wechsler Memory Scale, 4th Ed. Logical Memory (WMS IV-LM), Brief Visuospatial Memory Test, Revised (BVMT-R), Judgement of Line Orientation (JOLO), Trail Making Test, Clock Drawing Test, Grooved Pegboard Test, Independent Living Scales (ILS) Health & Safety and Money Management subtests, Activities of Daily Living Questionnaire (ADL-Q), Geriatric Depression Scale (GDS) and Garcia Anxiety Inventory (TOI).    Tests Administered 02/18/2022 via [facility] [facility] (administration and scoring of these tests were not billed for this encounter): Controlled Oral Word Association Test (COWAT) and Animal Fluency.    Performance Validity: Ms. Santos completed embedded measures of task engagement. Below-cutoff performance on any one stand-alone measure and/ or any two embedded measures of task engagement is highly unlikely to occur outside the context of poor or inconsistent effort during testing. Ms. Santos scored below predetermined cutoff on one embedded measure of task engagement and above predetermined cutoffs on all other administered measures of task engagement.  Due to the above documented neurologic history, below-cutoff scores on measures of task engagement are not unreasonable. As such, the resultant profile is interpreted to be a valid reflection of their cognitive status.     A pair of verbal fluency tests were administered via tele-health on a separate date of service from her original testing. Although standard precautions were taken to be sure that Ms. Santos was in a private, quiet, and distraction-free environment in her home during administration of those tasks, caution is warranted in interpretation of resultant data. These tests are indicated in the summary sheet below.     PREMORBID FUNCTIONING Raw Score Percentile/CP Descriptor   TOPF simple + pred. eFSIQ - 16 Low Average   Information 6 5 Below Average   LANGUAGE FUNCTIONING Raw Score Percentile/CP Descriptor   NAB Naming 30 73 Average   FAS* 21 1 Exceptionally Low   Animal Naming* 10 7 Below Average   VISUOSPATIAL FUNCTIONING Raw Score Percentile/CP Descriptor   Sanchez JOLO 8 1.5 Exceptionally Low   WAIS-IV Block Design 11 2 Below Average   Clock Copy 4 <8.5 Below Average   BVMT-R Copy Below Expectations - renderings generally matched simple target stimuli with minor distortions.    LEARNING & MEMORY Raw Score Percentile/CP Descriptor   HVLT-R       Total Immediate 12 1 Exceptionally Low    Delayed Recall 0 0.1 Exceptionally Low    Retention % 0 0.1 Exceptionally Low    Hits 1 - -   False Positives 2 - -   Discrimination  -1 0.1 Exceptionally Low    WMS-IV Subtests       LM I 16 9 Low Average   LM II 2 0.1 Exceptionally Low    LM Recognition 16 <2 Exceptionally Low   BVMT-R       IR (1, 1, 1) 3 <1 Exceptionally Low   DR 1 <1 Exceptionally Low   Discrimination Index 1 <1 Exceptionally Low   ATTENTION/WORKING MEMORY Raw Score Percentile/CP Descriptor         DS Forward 6 5 Below Average         DS Backward 8 50 Average         DS Sequence 3 5 Below Average   MENTAL PROCESSING SPEED Raw Score Percentile/CP  Descriptor   WAIS-IV Coding 20 1 Exceptionally Low    TMT A (0 errors) 82 1 Exceptionally Low    EXECUTIVE FUNCTIONING Raw Score Percentile/CP Descriptor   TMT B (4 errors) 360 0.5 Exceptionally Low    Clock Request 4 40.1 Average   WAIS-IV Similarities 16 2 Below Average   FRONTOMOTOR  Raw Score Percentile/CP Descriptor   GPT DH 92 38 Average   GPD  18 Low Average   FUNCTIONAL  Raw Score Percentile/CP Descriptor   ILS Managing Money 18 1 Dependent   ILS Health & Safety 24 0.1 Dependent   ADLQ Self Care Activities - 0 None to Mild   ADLQ Household Care - 11 None to Mild   ADLQ Employment & Recreation - 44 Moderate Impairment   ADLQ Shopping & Money - 44 Moderate Impairment   ADLQ Travel - 100 Severe Impairment   ADLQ Communication - 0 None to Mild   MOOD & PERSONALITY Raw Score Percentile/CP Descriptor   GDS-30 1 - WNL   TOI 9 - Minimal   *Administered via [employer].     Billing Documentation     Time on review of neuropsychological test data, data interpretation, and documentation of my interpretation: 169 minutes; [id number] &[id number] (x2).  Psychometrist time spent in the administration and scoring of 2 or more neuropsychological tests 207 minutes; [id number] & [id number] (x6).

## 2022-02-24 ENCOUNTER — TELEPHONE (OUTPATIENT)
Dept: NEUROLOGY | Facility: CLINIC | Age: 72
End: 2022-02-24
Payer: MEDICARE

## 2022-02-24 NOTE — TELEPHONE ENCOUNTER
Spoke to Ms. Santos about a letter they received in the mail containing an involvement in care form sent from our office. Her daughter called yesterday, and she claims she doesn't know why, as she already signed and believes it was put it back in the mail. She expressed understanding of the form and need for it. I gave her a call back number, and told her to call back with any further questions.

## 2022-02-28 ENCOUNTER — TELEPHONE (OUTPATIENT)
Dept: NEUROLOGY | Facility: CLINIC | Age: 72
End: 2022-02-28
Payer: MEDICARE

## 2022-02-28 NOTE — TELEPHONE ENCOUNTER
"Spoke to Phoenix on the phone, returning her call. She was concerned about her sister Nick having the feedback appointment with Dr. Luis over the phone, even though she does not live with their mother, Ms. Hilda Santos, as Phoenix does. Phoenix cancelled the audio feedback appointment with Ms. Romero, and told me that she did not mail back the involvement in care form giving us permission to speak to Nick as she told her mother she did. She instead scheduled an in-person feedback appointment that she will be bringing her mother to, stating that she "might" call her sister Nick during.   "

## 2022-03-29 ENCOUNTER — TELEPHONE (OUTPATIENT)
Dept: NEUROLOGY | Facility: CLINIC | Age: 72
End: 2022-03-29
Payer: MEDICARE

## 2022-03-29 ENCOUNTER — OFFICE VISIT (OUTPATIENT)
Dept: NEUROLOGY | Facility: CLINIC | Age: 72
End: 2022-03-29
Payer: MEDICARE

## 2022-03-29 DIAGNOSIS — F02.80 MAJOR NEUROCOGNITIVE DISORDER DUE TO MULTIPLE ETIOLOGIES: Primary | ICD-10-CM

## 2022-03-29 PROCEDURE — 96132 NRPSYC TST EVAL PHYS/QHP 1ST: CPT | Mod: ,,, | Performed by: STUDENT IN AN ORGANIZED HEALTH CARE EDUCATION/TRAINING PROGRAM

## 2022-03-29 PROCEDURE — 99499 UNLISTED E&M SERVICE: CPT | Mod: S$PBB,,, | Performed by: STUDENT IN AN ORGANIZED HEALTH CARE EDUCATION/TRAINING PROGRAM

## 2022-03-29 PROCEDURE — 96132 PR NEUROPSYCHOLOGIC TEST EVAL SVCS, 1ST HR: ICD-10-PCS | Mod: ,,, | Performed by: STUDENT IN AN ORGANIZED HEALTH CARE EDUCATION/TRAINING PROGRAM

## 2022-03-29 PROCEDURE — 99499 NO LOS: ICD-10-PCS | Mod: S$PBB,,, | Performed by: STUDENT IN AN ORGANIZED HEALTH CARE EDUCATION/TRAINING PROGRAM

## 2022-03-29 NOTE — TELEPHONE ENCOUNTER
I called Ms. Santos at her listed telephone number at our scheduled time to discuss the results of her neuropsychological testing. I was unable to reach Ms. Santos and left a voicemail soliciting a call back to discuss results.

## 2022-03-29 NOTE — PROGRESS NOTES
Ms. Santos and her daughters attended a in-person feedback session to discuss the results from her recent neuropsychological testing (see report dated 02/11/2022). We discussed her test results, diagnoses, and my recommendations. Ms. Santos and her daughters voiced their understanding of the information provided, and voiced their intention to follow through on the recommendations provided. All questions were answered to their satisfaction and Ms. Santos was provided with a copy of her report. she was encouraged to contact our office with any future questions or concerns.         _____________________  Romain Luis, Ph.D.  Neuropsychologist  [facility]  Department of Neurology     Billing Information:   Time spent discussing test results with the patient: 53 minutes.   Total time spent for this encounter, including discussion of test results, review of pertinent records, and documentation of this encounter: 68 minutes  20910 (1)

## 2025-05-19 ENCOUNTER — HOSPITAL ENCOUNTER (EMERGENCY)
Facility: HOSPITAL | Age: 75
Discharge: HOME | End: 2025-05-19
Attending: EMERGENCY MEDICINE
Payer: MEDICARE

## 2025-05-19 VITALS
WEIGHT: 190 LBS | OXYGEN SATURATION: 97 % | RESPIRATION RATE: 18 BRPM | BODY MASS INDEX: 33.66 KG/M2 | TEMPERATURE: 99 F | HEART RATE: 85 BPM | DIASTOLIC BLOOD PRESSURE: 85 MMHG | SYSTOLIC BLOOD PRESSURE: 165 MMHG

## 2025-05-19 DIAGNOSIS — M54.2 CERVICAL PAIN: ICD-10-CM

## 2025-05-19 DIAGNOSIS — M54.9 MUSCULOSKELETAL BACK PAIN: Primary | ICD-10-CM

## 2025-05-19 PROCEDURE — 25000003 PHARM REV CODE 250: Performed by: PHYSICIAN ASSISTANT

## 2025-05-19 PROCEDURE — 99284 EMERGENCY DEPT VISIT MOD MDM: CPT | Mod: 25

## 2025-05-19 RX ORDER — CLONIDINE HYDROCHLORIDE 0.1 MG/1
0.1 TABLET ORAL 2 TIMES DAILY PRN
COMMUNITY
Start: 2025-01-14

## 2025-05-19 RX ORDER — METHOCARBAMOL 500 MG/1
1000 TABLET, FILM COATED ORAL
Status: COMPLETED | OUTPATIENT
Start: 2025-05-19 | End: 2025-05-19

## 2025-05-19 RX ORDER — METHOCARBAMOL 750 MG/1
750 TABLET, FILM COATED ORAL 3 TIMES DAILY
Qty: 21 TABLET | Refills: 0 | Status: SHIPPED | OUTPATIENT
Start: 2025-05-19 | End: 2025-05-26

## 2025-05-19 RX ORDER — AMLODIPINE BESYLATE 5 MG/1
5 TABLET ORAL NIGHTLY
COMMUNITY
Start: 2025-04-28

## 2025-05-19 RX ORDER — IBUPROFEN 600 MG/1
600 TABLET, FILM COATED ORAL
Status: COMPLETED | OUTPATIENT
Start: 2025-05-19 | End: 2025-05-19

## 2025-05-19 RX ORDER — IBUPROFEN 600 MG/1
600 TABLET, FILM COATED ORAL 3 TIMES DAILY
Qty: 21 TABLET | Refills: 0 | Status: SHIPPED | OUTPATIENT
Start: 2025-05-19

## 2025-05-19 RX ORDER — LIDOCAINE 50 MG/G
1 PATCH TOPICAL DAILY
Qty: 15 PATCH | Refills: 0 | Status: SHIPPED | OUTPATIENT
Start: 2025-05-19

## 2025-05-19 RX ADMIN — IBUPROFEN 600 MG: 600 TABLET, FILM COATED ORAL at 08:05

## 2025-05-19 RX ADMIN — METHOCARBAMOL 1000 MG: 500 TABLET ORAL at 08:05

## 2025-05-20 NOTE — ED PROVIDER NOTES
Encounter Date: 5/19/2025       History     Chief Complaint   Patient presents with    Back Pain     Pt to er with lower back pain. Denies recent fall/trauma     74-year-old female presents to ED for evaluation of musculoskeletal neck and back pain.  Patient reports that over the last 3 days she has been having pain to those areas.  Reports pain worse with movement.  States that she laid in bed today because she would not want to move due to pain.  No medications taken.  No trauma or injury.  Denies any dysuria, hematuria, urgency or frequency.  Daughter reports patient has a history of bulging disc and arthritis in her back.    The history is provided by the patient. No  was used.     Review of patient's allergies indicates:   Allergen Reactions    Bactrim [sulfamethoxazole-trimethoprim]     Trimethoprim      Past Medical History:   Diagnosis Date    Dementia     History of heart attack     pt reported    Hypertension     Mixed hyperlipidemia     Stroke      Past Surgical History:   Procedure Laterality Date    CABG  2021    NECK SURGERY      pt reported    PHACOEMULSIFICATION, CATARACT, WITH IOL INSERTION Left 09/03/2024    Procedure: PHACO WITH IOL - OS;  Surgeon: Cassie Brooks MD;  Location: [facility];  Service: Ophthalmology;  Laterality: Left;     No family history on file.  Social History[1]  Review of Systems   Constitutional:  Negative for chills, fatigue and fever.   Respiratory:  Negative for shortness of breath.    Cardiovascular:  Negative for chest pain.   Gastrointestinal:  Negative for abdominal pain, diarrhea, nausea and vomiting.   Genitourinary:  Negative for dysuria, flank pain, frequency and urgency.   Musculoskeletal:  Positive for back pain and myalgias.   All other systems reviewed and are negative.      Physical Exam     Initial Vitals [05/19/25 2025]   BP Pulse Resp Temp SpO2   (!) 165/85 85 18 98.8 °F (37.1 °C) 97 %      MAP       --         Physical Exam    Nursing  note and vitals reviewed.  Constitutional: She appears well-developed and well-nourished.   HENT:   Head: Normocephalic and atraumatic.   Right Ear: Tympanic membrane and external ear normal.   Left Ear: Tympanic membrane and external ear normal. Mouth/Throat: Uvula is midline, oropharynx is clear and moist and mucous membranes are normal. No trismus in the jaw. No uvula swelling. No oropharyngeal exudate, posterior oropharyngeal edema or posterior oropharyngeal erythema.   Eyes: Conjunctivae are normal. Pupils are equal, round, and reactive to light.   Neck: Neck supple.   Normal range of motion.  Cardiovascular:  Normal rate, regular rhythm and normal heart sounds.           Pulmonary/Chest: Breath sounds normal. She has no wheezes. She has no rhonchi. She has no rales.   Abdominal: Abdomen is soft. Bowel sounds are normal. There is no abdominal tenderness.   Musculoskeletal:         General: Normal range of motion.      Cervical back: Normal range of motion and neck supple. Tenderness present. No swelling, deformity, spasms or bony tenderness. Pain with movement present.      Thoracic back: Tenderness present. No swelling, deformity, spasms or bony tenderness.      Lumbar back: Tenderness present. No swelling, deformity, spasms or bony tenderness.        Back:      Neurological: She is alert and oriented to person, place, and time. She has normal strength. No cranial nerve deficit or sensory deficit. GCS score is 15. GCS eye subscore is 4. GCS verbal subscore is 5. GCS motor subscore is 6.   Skin: Skin is warm and dry.   Psychiatric: She has a normal mood and affect.         ED Course   Procedures  Labs Reviewed - No data to display       Imaging Results              X-Ray Lumbar Spine Ap And Lateral (Preliminary result)  Result time 05/19/25 21:32:34      Wet Read by Tana Aguilar PA (05/19/25 21:32:34, [facility] - Emergency Dept, Emergency Medicine)    No acute fracture noted                                       X-Ray Cervical Spine AP And Lateral (Preliminary result)  Result time 05/19/25 21:32:50      Wet Read by Tana Aguilar PA (05/19/25 21:32:50, [facility] - Emergency Dept, Emergency Medicine)    No acute fracture noted. Hardware in place                                     Medications   methocarbamoL tablet 1,000 mg (1,000 mg Oral Given 5/19/25 2048)   ibuprofen tablet 600 mg (600 mg Oral Given 5/19/25 2048)     Medical Decision Making  74-year-old female presents to ED for evaluation of musculoskeletal neck and back pain.  Patient reports that over the last 3 days she has been having pain to those areas.  Reports pain worse with movement.  States that she laid in bed today because she would not want to move due to pain.  No medications taken.  No trauma or injury.  Denies any dysuria, hematuria, urgency or frequency.  Daughter reports patient has a history of bulging disc and arthritis in her back.    Differential diagnosis includes but isn't limited to musculoskeletal pain, arthritis, UTI, kidney stone, strain, degenerative disc, sciatica    Amount and/or Complexity of Data Reviewed  Radiology: ordered and independent interpretation performed.  Discussion of management or test interpretation with external provider(s): Patient GCS 15 and neuro intact presents to ED for evaluation of neck and back pain.  Patient's pain is musculoskeletal in nature.  Paraspinals tenderness noted.  No spinal tenderness on exam.  Patient has no saddle anesthesia loss of bowel or urinary incontinence.  Denies any new trauma or injury.  Patient feeling better after Robaxin and ibuprofen.  X-rays obtained without any acute findings.  Discussed follow up with her PCP.  Will give short course of symptomatic care.  Patient and daughter verbalized understanding and agree with plan of care.    Risk  OTC drugs.  Prescription drug management.                                      Clinical Impression:  Final  diagnoses:  [M54.2] Cervical pain  [M54.9] Musculoskeletal back pain (Primary)          ED Disposition Condition    Discharge Stable          ED Prescriptions       Medication Sig Dispense Start Date End Date Auth. Provider    LIDOcaine (LIDODERM) 5 % Place 1 patch onto the skin once daily. Remove & Discard patch within 12 hours or as directed by MD 15 patch 5/19/2025 -- Tana Aguilar PA    methocarbamoL (ROBAXIN) 750 MG Tab Take 1 tablet (750 mg total) by mouth 3 (three) times daily. for 7 days 21 tablet 5/19/2025 5/26/2025 Tana Aguilar PA    ibuprofen (ADVIL,MOTRIN) 600 MG tablet Take 1 tablet (600 mg total) by mouth 3 (three) times daily. 21 tablet 5/19/2025 -- Tana Aguilar PA          Follow-up Information       Follow up With Specialties Details Why Contact Info    Radha Lebron MD Internal Medicine   [address] [address]  [phone]                     [1]   Social History  Tobacco Use    Smoking status: Never    Smokeless tobacco: Never   Substance Use Topics    Alcohol use: Yes     Comment: occasionally    Drug use: Never        Tana Aguilar PA  05/19/25 [id number]

## 2025-05-20 NOTE — DISCHARGE INSTRUCTIONS
Use ibuprofen and Tylenol as needed for pain and swelling.  May use Robaxin for muscle pain and spasms.  May place lidocaine patches to area.  Follow up with PCP in 5-7 days as needed.